# Patient Record
Sex: FEMALE | Race: WHITE | NOT HISPANIC OR LATINO | Employment: STUDENT | URBAN - METROPOLITAN AREA
[De-identification: names, ages, dates, MRNs, and addresses within clinical notes are randomized per-mention and may not be internally consistent; named-entity substitution may affect disease eponyms.]

---
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
## 2023-04-23 ENCOUNTER — HOSPITAL ENCOUNTER (EMERGENCY)
Facility: CLINIC | Age: 23
Discharge: HOME | End: 2023-04-23
Attending: EMERGENCY MEDICINE | Admitting: EMERGENCY MEDICINE
Payer: COMMERCIAL

## 2023-04-23 VITALS
OXYGEN SATURATION: 99 % | TEMPERATURE: 98.5 F | DIASTOLIC BLOOD PRESSURE: 75 MMHG | HEIGHT: 64 IN | HEART RATE: 90 BPM | BODY MASS INDEX: 24.24 KG/M2 | SYSTOLIC BLOOD PRESSURE: 129 MMHG | RESPIRATION RATE: 18 BRPM | WEIGHT: 142 LBS

## 2023-04-23 DIAGNOSIS — T65.91XA INGESTION OF SUBSTANCE, ACCIDENTAL OR UNINTENTIONAL, INITIAL ENCOUNTER: ICD-10-CM

## 2023-04-23 LAB
ALBUMIN SERPL BCG-MCNC: 5 G/DL (ref 3.5–5.2)
ALP SERPL-CCNC: 66 U/L (ref 35–104)
ALT SERPL W P-5'-P-CCNC: 22 U/L (ref 10–35)
AMPHETAMINES UR QL SCN: NORMAL
ANION GAP SERPL CALCULATED.3IONS-SCNC: 13 MMOL/L (ref 7–15)
AST SERPL W P-5'-P-CCNC: 25 U/L (ref 10–35)
BARBITURATES UR QL SCN: NORMAL
BASOPHILS # BLD AUTO: 0 10E3/UL (ref 0–0.2)
BASOPHILS NFR BLD AUTO: 0 %
BENZODIAZ UR QL SCN: NORMAL
BILIRUB SERPL-MCNC: 0.8 MG/DL
BUN SERPL-MCNC: 11.4 MG/DL (ref 6–20)
BZE UR QL SCN: NORMAL
CALCIUM SERPL-MCNC: 10.1 MG/DL (ref 8.6–10)
CANNABINOIDS UR QL SCN: NORMAL
CHLORIDE SERPL-SCNC: 103 MMOL/L (ref 98–107)
CREAT SERPL-MCNC: 0.6 MG/DL (ref 0.51–0.95)
DEPRECATED HCO3 PLAS-SCNC: 22 MMOL/L (ref 22–29)
EOSINOPHIL # BLD AUTO: 0.1 10E3/UL (ref 0–0.7)
EOSINOPHIL NFR BLD AUTO: 1 %
ERYTHROCYTE [DISTWIDTH] IN BLOOD BY AUTOMATED COUNT: 12.8 % (ref 10–15)
ETHANOL SERPL-MCNC: <0.01 G/DL
GFR SERPL CREATININE-BSD FRML MDRD: >90 ML/MIN/1.73M2
GLUCOSE SERPL-MCNC: 92 MG/DL (ref 70–99)
GROUP A STREP BY PCR: NOT DETECTED
HCG SERPL QL: NEGATIVE
HCT VFR BLD AUTO: 44.8 % (ref 35–47)
HGB BLD-MCNC: 15.1 G/DL (ref 11.7–15.7)
IMM GRANULOCYTES # BLD: 0 10E3/UL
IMM GRANULOCYTES NFR BLD: 0 %
LIPASE SERPL-CCNC: 11 U/L (ref 13–60)
LYMPHOCYTES # BLD AUTO: 3.3 10E3/UL (ref 0.8–5.3)
LYMPHOCYTES NFR BLD AUTO: 31 %
MAGNESIUM SERPL-MCNC: 1.9 MG/DL (ref 1.7–2.3)
MCH RBC QN AUTO: 27.7 PG (ref 26.5–33)
MCHC RBC AUTO-ENTMCNC: 33.7 G/DL (ref 31.5–36.5)
MCV RBC AUTO: 82 FL (ref 78–100)
MONOCYTES # BLD AUTO: 0.8 10E3/UL (ref 0–1.3)
MONOCYTES NFR BLD AUTO: 8 %
MONOCYTES NFR BLD AUTO: NEGATIVE %
NEUTROPHILS # BLD AUTO: 6.4 10E3/UL (ref 1.6–8.3)
NEUTROPHILS NFR BLD AUTO: 60 %
NRBC # BLD AUTO: 0 10E3/UL
NRBC BLD AUTO-RTO: 0 /100
OPIATES UR QL SCN: NORMAL
PLATELET # BLD AUTO: 359 10E3/UL (ref 150–450)
POTASSIUM SERPL-SCNC: 3.7 MMOL/L (ref 3.4–5.3)
PROT SERPL-MCNC: 7.9 G/DL (ref 6.4–8.3)
RBC # BLD AUTO: 5.45 10E6/UL (ref 3.8–5.2)
SODIUM SERPL-SCNC: 138 MMOL/L (ref 136–145)
TROPONIN T SERPL HS-MCNC: <6 NG/L
WBC # BLD AUTO: 10.7 10E3/UL (ref 4–11)

## 2023-04-23 PROCEDURE — 99284 EMERGENCY DEPT VISIT MOD MDM: CPT | Mod: 25 | Performed by: EMERGENCY MEDICINE

## 2023-04-23 PROCEDURE — 83735 ASSAY OF MAGNESIUM: CPT | Performed by: EMERGENCY MEDICINE

## 2023-04-23 PROCEDURE — 87651 STREP A DNA AMP PROBE: CPT | Performed by: EMERGENCY MEDICINE

## 2023-04-23 PROCEDURE — 258N000003 HC RX IP 258 OP 636: Performed by: EMERGENCY MEDICINE

## 2023-04-23 PROCEDURE — 93010 ELECTROCARDIOGRAM REPORT: CPT | Performed by: EMERGENCY MEDICINE

## 2023-04-23 PROCEDURE — 80307 DRUG TEST PRSMV CHEM ANLYZR: CPT | Performed by: EMERGENCY MEDICINE

## 2023-04-23 PROCEDURE — 83690 ASSAY OF LIPASE: CPT | Performed by: EMERGENCY MEDICINE

## 2023-04-23 PROCEDURE — 82077 ASSAY SPEC XCP UR&BREATH IA: CPT | Performed by: EMERGENCY MEDICINE

## 2023-04-23 PROCEDURE — 86308 HETEROPHILE ANTIBODY SCREEN: CPT | Performed by: EMERGENCY MEDICINE

## 2023-04-23 PROCEDURE — 84484 ASSAY OF TROPONIN QUANT: CPT | Performed by: EMERGENCY MEDICINE

## 2023-04-23 PROCEDURE — 85004 AUTOMATED DIFF WBC COUNT: CPT | Performed by: EMERGENCY MEDICINE

## 2023-04-23 PROCEDURE — 84703 CHORIONIC GONADOTROPIN ASSAY: CPT | Performed by: EMERGENCY MEDICINE

## 2023-04-23 PROCEDURE — 36415 COLL VENOUS BLD VENIPUNCTURE: CPT | Performed by: EMERGENCY MEDICINE

## 2023-04-23 PROCEDURE — 93005 ELECTROCARDIOGRAM TRACING: CPT | Performed by: EMERGENCY MEDICINE

## 2023-04-23 PROCEDURE — 80053 COMPREHEN METABOLIC PANEL: CPT | Performed by: EMERGENCY MEDICINE

## 2023-04-23 PROCEDURE — 96360 HYDRATION IV INFUSION INIT: CPT | Performed by: EMERGENCY MEDICINE

## 2023-04-23 RX ORDER — SODIUM CHLORIDE 9 MG/ML
INJECTION, SOLUTION INTRAVENOUS CONTINUOUS
Status: DISCONTINUED | OUTPATIENT
Start: 2023-04-23 | End: 2023-04-23 | Stop reason: HOSPADM

## 2023-04-23 RX ORDER — HYDROXYZINE HYDROCHLORIDE 25 MG/1
25 TABLET, FILM COATED ORAL 3 TIMES DAILY PRN
COMMUNITY

## 2023-04-23 RX ORDER — FLUOXETINE 10 MG/1
CAPSULE ORAL DAILY
COMMUNITY

## 2023-04-23 RX ADMIN — SODIUM CHLORIDE 1000 ML: 9 INJECTION, SOLUTION INTRAVENOUS at 17:06

## 2023-04-23 RX ADMIN — SODIUM CHLORIDE: 0.9 INJECTION, SOLUTION INTRAVENOUS at 17:44

## 2023-04-23 ASSESSMENT — ACTIVITIES OF DAILY LIVING (ADL): ADLS_ACUITY_SCORE: 35
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
## 2023-04-23 NOTE — LETTER
[facility] EMERGENCY DEPARTMENT  [address] [place] [address]  [phone]      2023    Compa Sampson  [address] [address] [phone] (home)     : 2000      To Whom it may concern:    Compa Sampson was seen in our Emergency Department today, 2023.  Please excuse her from work today.      Sincerely,    Carley Davila MD
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
## 2023-04-23 NOTE — DISCHARGE INSTRUCTIONS
Please make an appointment to follow up with [facility] (phone: [phone]) as needed.      Continue to drink at least 8 glasses of electrolyte containing fluids a day to stay well hydrated.  If you have any worsening symptoms including chest pain, shortness of breath, feeling like you are going to pass out of other concerns, return to the emergency department for re-evaluation.

## 2023-04-24 LAB
ATRIAL RATE - MUSE: 86 BPM
DIASTOLIC BLOOD PRESSURE - MUSE: NORMAL MMHG
INTERPRETATION ECG - MUSE: NORMAL
P AXIS - MUSE: 35 DEGREES
PR INTERVAL - MUSE: 138 MS
QRS DURATION - MUSE: 88 MS
QT - MUSE: 376 MS
QTC - MUSE: 449 MS
R AXIS - MUSE: 49 DEGREES
SYSTOLIC BLOOD PRESSURE - MUSE: NORMAL MMHG
T AXIS - MUSE: 20 DEGREES
VENTRICULAR RATE- MUSE: 86 BPM

## 2024-12-22 ENCOUNTER — HOSPITAL ENCOUNTER (EMERGENCY)
Facility: CLINIC | Age: 24
Discharge: HOME | End: 2024-12-22
Attending: EMERGENCY MEDICINE | Admitting: EMERGENCY MEDICINE
Payer: COMMERCIAL

## 2024-12-22 VITALS
BODY MASS INDEX: 29.31 KG/M2 | WEIGHT: 171.7 LBS | TEMPERATURE: 98.4 F | HEART RATE: 95 BPM | HEIGHT: 64 IN | DIASTOLIC BLOOD PRESSURE: 79 MMHG | OXYGEN SATURATION: 99 % | SYSTOLIC BLOOD PRESSURE: 124 MMHG | RESPIRATION RATE: 16 BRPM

## 2024-12-22 DIAGNOSIS — F43.22 ADJUSTMENT DISORDER WITH ANXIOUS MOOD: ICD-10-CM

## 2024-12-22 DIAGNOSIS — F32.A DEPRESSION, UNSPECIFIED DEPRESSION TYPE: ICD-10-CM

## 2024-12-22 DIAGNOSIS — F41.9 ANXIETY: ICD-10-CM

## 2024-12-22 PROBLEM — F43.23 ADJUSTMENT DISORDER WITH MIXED ANXIETY AND DEPRESSED MOOD: Status: ACTIVE | Noted: 2024-12-22

## 2024-12-22 LAB — HCG UR QL: NEGATIVE

## 2024-12-22 PROCEDURE — 81025 URINE PREGNANCY TEST: CPT | Performed by: EMERGENCY MEDICINE

## 2024-12-22 PROCEDURE — 99283 EMERGENCY DEPT VISIT LOW MDM: CPT

## 2024-12-22 PROCEDURE — 99239 HOSP IP/OBS DSCHRG MGMT >30: CPT | Performed by: NURSE PRACTITIONER

## 2024-12-22 RX ORDER — HYDROXYZINE HYDROCHLORIDE 50 MG/1
1 TABLET, FILM COATED ORAL
COMMUNITY
Start: 2024-06-05

## 2024-12-22 RX ORDER — ALBUTEROL SULFATE 90 UG/1
2 INHALANT RESPIRATORY (INHALATION) EVERY 4 HOURS PRN
COMMUNITY
Start: 2024-06-03

## 2024-12-22 RX ORDER — FLUOXETINE 40 MG/1
40 CAPSULE ORAL DAILY
COMMUNITY
Start: 2024-10-04

## 2024-12-22 ASSESSMENT — ACTIVITIES OF DAILY LIVING (ADL)
ADLS_ACUITY_SCORE: 41

## 2024-12-22 ASSESSMENT — COLUMBIA-SUICIDE SEVERITY RATING SCALE - C-SSRS
2. HAVE YOU ACTUALLY HAD ANY THOUGHTS OF KILLING YOURSELF IN THE PAST MONTH?: NO
1. IN THE PAST MONTH, HAVE YOU WISHED YOU WERE DEAD OR WISHED YOU COULD GO TO SLEEP AND NOT WAKE UP?: NO
6. HAVE YOU EVER DONE ANYTHING, STARTED TO DO ANYTHING, OR PREPARED TO DO ANYTHING TO END YOUR LIFE?: NO

## 2024-12-22 NOTE — ED PROVIDER NOTES
"  Emergency Department Note      History of Present Illness     Chief Complaint   Anxiety      HPI   Compa Sampson is a 24 year old female presents for evaluation of anxiety and depression.  Patient reports that she is felt depressed for months though feels as though her symptoms are getting worse.  She notes stressors include relationship issues.  She denies suicidal or homicidal ideation.  Patient is established with a therapist and endorses compliance with with medications.  Currently her therapist is on vacation for the next 2 weeks.  Denies illicit drug use, significant alcohol usage, or intentional overdose.    Independent Historian   None    Review of External Notes   N/A    Past Medical History     Medical History and Problem List   Depression  Anxiety    Medications   albuterol (PROAIR HFA/PROVENTIL HFA/VENTOLIN HFA) 108 (90 Base) MCG/ACT inhaler  FLUoxetine (PROZAC) 40 MG capsule  hydrOXYzine HCl (ATARAX) 50 MG tablet  benzonatate (TESSALON) 100 MG capsule        Surgical History   No past surgical history on file.    Physical Exam     Patient Vitals for the past 24 hrs:   BP Temp Temp src Pulse Resp SpO2 Height Weight   12/22/24 1538 124/79 98.4  F (36.9  C) Oral 95 16 99 % 1.626 m (5' 4\") 77.9 kg (171 lb 11.2 oz)   12/22/24 1336 126/85 98.2  F (36.8  C) -- 118 14 97 % -- --     Physical Exam  General: Patient in moderate emotional distress.  Tearful on exam.  Alert and cooperative with exam. Normal mentation  HEENT: NC/AT. Conjunctiva without injection or scleral icterus. External ears normal.  Respiratory: Breathing comfortably on room air  CV: Normal rate, all extremities well perfused  GI:  Non-distended abdomen  Skin: Warm, dry, no rashes/open wounds on exposed skin  Musculoskeletal: No obvious deformities  Neuro: Alert, answers questions appropriately. No gross motor deficits  Psychiatric: Endorses depression and anxiety.  Denies suicidal homicidal ideation      Diagnostics     Lab " Results   Labs Ordered and Resulted from Time of ED Arrival to Time of ED Departure   HCG QUALITATIVE URINE - Normal       Result Value    hCG Urine Qualitative Negative           Independent Interpretation   None    ED Course      Medications Administered   Medications - No data to display    Procedures   Procedures     Discussion of Management   None    ED Course        Additional Documentation  None    Medical Decision Making / Diagnosis     CMS Diagnoses: None    MIPS       None    MDM   Compa Sampson is a 24 year old female who presents for evaluation of depressed mood and anxiety.  There is history of depression in the past and they are on medications. There is no signs at this point of a general medical problem causing depression and patient denies toxic ingestion, illicit drug use, intentional overdose, or current intoxication.  No indication for mental health hold.  Denies suicidal homicidal ideation.  Patient is agreeable to voluntary assessment in the empath unit.  Medically clear for transfer to empath.    Disposition   The patient was transferred to [facility].     Diagnosis     ICD-10-CM    1. Anxiety  F41.9       2. Depression, unspecified depression type  F32.A       3. Adjustment disorder with anxious mood  F43.22                   Bernabe Henson, DO  12/22/24 1939

## 2024-12-22 NOTE — ED TRIAGE NOTES
Pt has been experiencing multiple stressors and worsening anxiety, her therapist is out of town for 2 weeks, denies suicidal ideation

## 2024-12-22 NOTE — ED NOTES
[facility]  ED to EMPATH Checklist:      Goal for EMPATH: Depression management and Anxiety management    Current Behavior: Sad    Safety Concerns: None    Legal Hold Status: Voluntary    Medically Cleared by ED provider: Yes    Patient Therapeutically Searched: Not searched - Currently in triage    Belongings: Remain with patient    Independent Ambulation at Baseline: Yes/No: Yes    Participates in Care/Conversation: Yes/No: Yes    Patient Informed about EMPATH: Yes/No: Yes    DEC: Ordered and pending    Patient Ready to be Transferred to EMPATH? Yes/No: Yes

## 2024-12-22 NOTE — CONSULTS
"Diagnostic Evaluation Consultation  Crisis Assessment    Patient Name: Compa Sampson  Age:  24 year old  Legal Sex: female  Gender Identity: female  Pronouns:   Race: White  Ethnicity: Not  or   Language: English      Patient was assessed: In person   Crisis Assessment Start Date: 12/22/24  Crisis Assessment Start Time: 1730  Crisis Assessment Stop Time: 1810  Patient location: [facility] EMERGENCY DEPT EMP 14                                 Referral Data and Chief Complaint  Compa Sampson presents to the ED by  self. Patient is presenting to the ED for the following concerns: Depression. Factors that make the mental health crisis life threatening or complex are: Patient presented to the emergency room due to depression in the context of a break up. Pt stated that her relationship with her ex-boyfriend ended six months ago when he moved to [place], [place] for a job. He did not want to be in a long-distance relationship, yet they have had ongoing  contact and \"hook ups\" since the break up. Her ex is in town now, and he told her today that he is pursuing a relationship with another woman that lives in [place]. Pt stated that she had an \"inkling,\" about this other girl, but the \"confirmation\" she got was unbearable for her. At the time of assessment pt was very tearful, emotionally distraught, and crying uncontrollably. She reported feeling lonely and undesirable by her ex. She shared that \"all\" of her friends have boyfriends, are , or engaged. Her thinking was rigid and catastrophic. She made statements such as \"I am going to be alone forever,\" and \"There is no one else, I only want him.\" She is fixated on having a conversation with him about why a long distance relationship with her can work. Pt was not receptive to any therapeutic interventions. She reported feeling passively suicidal, but denied active SI. She does not have intent to harm " "herself..      Informed Consent and Assessment Methods  Explained the crisis assessment process, including applicable information disclosures and limits to confidentiality, assessed understanding of the process, and obtained consent to proceed with the assessment.  Assessment methods included conducting a formal interview with patient, review of medical records, collaboration with medical staff, and obtaining relevant collateral information from family and community providers when available.  : done     History of the Crisis   Patient has a mental health history notable for anxiety and depression. She went to an urgent care during her initial break up with her ex-boyfriend on 06/24/24. Since then, pt stated that she has traveled to see him or she meets him when he comes back to [place]. Pt stated that he friends have told her to end contact with him, but she has found that too difficult to do. Pt has a therapist that she has been with for several years. She last met with her on Monday, when \"everything was just peachy then.\" Her therapist is out of the office for the next two weeks. Pt lives at home with her parents and brother. 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    Brief Psychosocial History  Family:  Single, Children no  Support System:  Parent(s), Friend  Employment Status:  employed full-time  Source of Income:  salary/wages  Financial Environmental Concerns:  none  Current Hobbies:     Barriers in Personal Life:  mental health concerns, emotional concerns    Significant Clinical History  Current Anxiety Symptoms:  excessive worry, racing thoughts, anxious  Current Depression/Trauma:  crying or feels like crying, helplessness, low self esteem, impaired decision making, negativistic, thoughts of death/suicide, sadness  Current Somatic Symptoms:     Current Psychosis/Thought Disturbance:  displaces blame  Current Eating Symptoms:  loss of appetite  Chemical Use " "History:  Alcohol: Social  Benzodiazepines: None  Opiates: None  Cocaine: None  Marijuana: None  Other Use: None   Past diagnosis:  Anxiety Disorder, Depression  Family history:  No known history of mental health or chemical health concerns  Past treatment:  Individual therapy, Primary Care  Details of most recent treatment:  Pt has a therapist that she has been with for several years. She last met with her on Monday, but is out of the office for the next two weeks.  Other relevant history:       Have there been any medication changes in the past two weeks:  no       Is the patient compliant with medications:  yes        Collateral Information  Is there collateral information: Yes     Collateral information name, relationship, phone number:  Rudy (Mother) [phone] (Mobile)    What happened today: Rudy was not aware that patient was in the ED until her  informed her. She is aware of pt's heartbreak and how pt went to the urgent care six months ago for similar concerns.     What is different about patient's functioning: Rudy stated that a couple of years ago, when pt was still in college, pt was started on medications. Rudy expressed concern that pt's doses got \"higher and higher.\" That is when she noticed that pt was more emotional.     Has patient made comments about wanting to kill themselves/others: no (Pt has made comments about not wanting to be alive.)    If d/c is recommended, can they take part in safety/aftercare planning: yes         Risk Assessment  [place] Suicide Severity Rating Scale Full Clinical Version:  Suicidal Ideation  Q1 Wish to be Dead (Lifetime): Yes  Q2 Non-Specific Active Suicidal Thoughts (Lifetime): No  3. Active Suicidal Ideation with any Methods (Not Plan) Without Intent to Act (Lifetime): No  Q4 Active Suicidal Ideation with Some Intent to Act, Without Specific Plan (Lifetime): No  Q5 Active Suicidal Ideation with Specific Plan and Intent (Lifetime): No  Q6 Suicide " Behavior (Lifetime): no  Intensity of Ideation (Lifetime)  Most Severe Ideation Rating (Lifetime): 1  Description of Most Severe Ideation (Lifetime): Pt reported having passive SI today.  Frequency (Lifetime): Less than once a week  Duration (Lifetime): Less than 1 hour/some of the time  Controllability (Lifetime): Can control thoughts with little difficulty  Deterrents (Lifetime): Deterrents definitely stopped you from attempting suicide  Reasons for Ideation (Lifetime): Equally to get attention, revenge, or a reaction from others and to end/stop the pain  Suicidal Behavior (Lifetime)  Actual Attempt (Lifetime): No  Has subject engaged in non-suicidal self-injurious behavior? (Lifetime): No  Interrupted Attempts (Lifetime): No  Aborted or Self-Interrupted Attempt (Lifetime): No  Preparatory Acts or Behavior (Lifetime): No    [place] Suicide Severity Rating Scale Recent:   Suicidal Ideation (Recent)  Q1 Wished to be Dead (Past Month): yes  Q2 Suicidal Thoughts (Past Month): no  Level of Risk per Screen: low risk  Intensity of Ideation (Recent)  Most Severe Ideation Rating (Past 1 Month): 1  Description of Most Severe Ideation (Past 1 Month): Pt reported having passive SI today.  Frequency (Past 1 Month): Less than once a week  Duration (Past 1 Month): Less than 1 hour/some of the time  Controllability (Past 1 Month): Can control thoughts with little difficulty  Deterrents (Past 1 Month): Deterrents definitely stopped you from attempting suicide  Reasons for Ideation (Past 1 Month): Equally to get attention, revenge, or a reaction from others and to end/stop the pain  Suicidal Behavior (Recent)  Actual Attempt (Past 3 Months): No  Has subject engaged in non-suicidal self-injurious behavior? (Past 3 Months): No  Interrupted Attempts (Past 3 Months): No  Preparatory Acts or Behavior (Past 3 Months): No    Environmental or Psychosocial Events: challenging interpersonal relationships, helplessness/hopelessness,  impulsivity/recklessness, other life stressors  Protective Factors: Protective Factors: strong bond to family unit, community support, or employment, responsibilities and duties to others, including pets and children, lives in a responsibly safe and stable environment, good treatment engagement, help seeking, able to access care without barriers, supportive ongoing medical and mental health care relationships, optimistic outlook - identification of future goals    Does the patient have thoughts of harming others? Feels Like Hurting Others: no  Previous Attempt to Hurt Others: no  Current presentation:  (tearful)  Is the patient engaging in sexually inappropriate behavior?: no  Does Patient have a known history of aggressive behavior: No  Has aggression occurred as a result of MH concerns/diagnosis: no  Does patient have history of aggression in hospital: no    Is the patient engaging in sexually inappropriate behavior?  no        Mental Status Exam   Affect: Dramatic  Appearance: Disheveled  Attention Span/Concentration: Attentive  Eye Contact: Engaged    Fund of Knowledge: Appropriate   Language /Speech Content: Fluent  Language /Speech Volume: Normal  Language /Speech Rate/Productions: Normal  Recent Memory: Intact  Remote Memory: Intact  Mood: Sad  Orientation to Person: Yes   Orientation to Place: Yes  Orientation to Time of Day: Yes  Orientation to Date: Yes     Situation (Do they understand why they are here?): Yes  Psychomotor Behavior: Normal  Thought Content: Clear  Thought Form: Goal Directed          Current Care Team  Patient Care Team:  No Ref-Primary, Physician as PCP - General    Diagnosis  Patient Active Problem List   Diagnosis Code    Adjustment disorder with mixed anxiety and depressed mood F43.23       Primary Problem This Admission  Active Hospital Problems    *Adjustment disorder with mixed anxiety and depressed mood        Clinical Summary and Substantiation of Recommendations   Clinical  "Substantiation:  Patient presented to the emergency room due to depression in the context of a break up. The break up occurred six months ago, which she sought help at an urgent care at the time. The ex moved to [place] and did not want to have a long-distance relationship, yet they have had ongoing  contact and \"hook ups\" since the break up. Pt's ex is in town now, and he told her today that he is pursuing a relationship with another woman that lives in [place]. Pt stated that this news was unbearable because she was hoping to get back together and attempt a long-distance relationship. She reported feeling lonely and undesirable by her ex. She reported feeling passively suicidal, but denied active SI. She does not have intent to harm herself. Her thinking was rigid and catastrophic. Pt was not receptive to any therapeutic interventions. She was interested in meeting with the psych provider to discuss her medications. She is requesting to discharge home. Pt's parents are in agreement to have pt discharge home. It is recommended that pt follow up with her outpatient mental health providers. Pt was offered to have therapy appointments scheduled while her therapist is out, but pt declined.        Treatment Objectives Addressed:  rapport building, orienting the patient to therapy, processing feelings, identifying an appropriate aftercare plan    Therapeutic Interventions:  Engaged in cognitive restructuring/ reframing, looked at common cognitive distortions and challenged negative thoughts., Taught the link between thoughts, feelings, and behaviors., Reviewed healthy living that supports positive mental health, including looking at sleep hygiene, regular movement, nutrition, and regular socialization., Provided positive reinforcement for progress towards goals, gains in knowledge, and application of skills previously taught., Introduced and explored accumulating positives., Introduced and practiced urge surfing.    Has " a specific means been identified for suicidal/homicide actions: No      Patient coping skills attempted to reduce the crisis:  Pt came to the ED voluntarily.    Disposition  Recommended referrals: Individual Therapy, Medication Management        Reviewed case and recommendations with attending provider. Attending Name: MARCO ANTONIO Smith       Attending concurs with disposition: yes       Patient and/or validated legal guardian concurs with disposition: yes       Final disposition:  discharge              Legal status: Voluntary/Patient has signed consent for treatment                                                                                                     Reviewed court records: yes       Assessment Details   Total duration spent with the patient: 40 min     CPT code(s) utilized: 82892 - Psychotherapy for Crisis - 60 (30-74*) min    BESSY Conrad, Psychotherapist  [facility] - Triage & Transition Services  Callback: [phone]

## 2024-12-22 NOTE — ED NOTES
"24 year old female transferred to [facility]  due to anxiety. Reports having multiple stressors including a breakup with her partner, which she is having significant trouble moving on from. Patient also cited conflict with her friends. She described a situation where she was not included in her friends wedding plans, which she said left her feeling more isolated and lonely. Patient also reports that her brother's friend moved into their house and the move has impacted her daily routine. Patient also cited that being around multiple family members whom she claims have drinking problems, which are contributing to her stress. Patient currently denies SI/HI, although she endorsed that she sometimes feels like \"I would be better off not being around.\"  Patient maintained she has no plan or intent to harm herself  or end her life. She mainly spoke about her feelings not being acknowledged. Reports she either sleeps too much or too little to escape her circumstances. Patient also said she came here because her therapist is out of town for 2  weeks. Previous MH history includes depression and anxiety. Patient presents with tearful affect, depressed in mood. Nursing and risk assessments completed. Assessments reviewed with LMHP and physician. Video monitoring in progress, patient informed.  Admission information reviewed with patient. Patient given a tour of [facility] and instructions on using the facility. Questions regarding [facility] addressed. Pt search completed and belongings inventoried.  "

## 2024-12-23 ENCOUNTER — TELEPHONE (OUTPATIENT)
Dept: BEHAVIORAL HEALTH | Facility: CLINIC | Age: 24
End: 2024-12-23
Payer: COMMERCIAL

## 2024-12-23 NOTE — ED NOTES
Patient has been discharged back to her home with a safety plan in place. Discharge instructions were reviewed with the patient. No medication changes were made during this visit. Reviewed safety plan and outpatient resources. Patient denies suicidal ideation and homicidal ideation. All belongings that were brought into the hospital have been returned to the patient. Escorted off the unit at 1925 accompanied by [facility] staff. Transportation was provided via her father.

## 2024-12-23 NOTE — ED PROVIDER NOTES
[facility] Unit - Psychiatry  Combined Observation Note and Discharge Summary  [facility] Emergency Department  Observation Initiation Date: Dec 22, 2024    Compa Sampson MRN: [id number]   Age: 24 year old YOB: 2000     History     Chief Complaint   Patient presents with    Anxiety     HPI  Compa Sampson is a 24 year old female with a past history notable for anxiety and adjustment disorder.  She came in today after experiencing significant anxiety secondary to breaking up with her boyfriend.  The actual break-up happened in June 2024.  Her ex-boyfriend recently came back and told her that he is in a relationship with another person.  This caused the patient significant distress and anxiety.  Patient stated that she has a therapist and her therapist is not available for the next 2 weeks.    Patient requested to discharge tonight to her parent's house.  She strongly denied thoughts of harming herself or others.  She denied ever experiencing thoughts of self-harm.  She said that she is very upset about the break-up and needs some space.  She told me that her family is supportive.  However, she will request from her family some time to grieve the relationship.  Patient was future oriented and said that she would possibly attend Prasad services at her [employer].  She declined medication changes.  She is currently prescribed Prozac 40 mg a day and hydroxyzine 50 mg daily as needed.  She said that she does not like to take hydroxyzine because it makes her sleepy.  At the same time, she acknowledged that the best thing for her to do would be to rest and not contact her boyfriend.  She also made a comment that 40 mg of Prozac might be too much and she will be contact her provider to discuss dose reduction.  At this time, she is not taking Prozac consistently.  Patient stated that her father was on his way to pick her up.  Patient agreed that while her therapist is not available it may be a  "good idea to reach out to her therapist's supervisor who may want to provide therapy services while the therapist is gone.  Patient stated that she will most likely go to work tomorrow.  She also said that she will journal today.  She stated that she feels comfortable and safe discharging.  Patient did not demonstrate any evidence of agueda or psychosis, delusional or paranoid ideation.  At the time of discharge she strongly denied thoughts of harming herself or others.      Past Medical History  No past medical history on file.  No past surgical history on file.  albuterol (PROAIR HFA/PROVENTIL HFA/VENTOLIN HFA) 108 (90 Base) MCG/ACT inhaler  FLUoxetine (PROZAC) 40 MG capsule  hydrOXYzine HCl (ATARAX) 50 MG tablet  benzonatate (TESSALON) 100 MG capsule      No Known Allergies  Family History  No family history on file.  Social History   Social History     Tobacco Use    Smoking status: Never    Smokeless tobacco: Never      Past medical history, past surgical history, medications, allergies, family history, and social history were reviewed with the patient. No additional pertinent items.       Review of Systems  A medically appropriate review of systems was performed with pertinent positives and negatives noted in the HPI, and all other systems negative.    Physical Examination   BP: 126/85  Pulse: 118  Temp: 98.2  F (36.8  C)  Resp: 14  Height: 162.6 cm (5' 4\")  Weight: 77.9 kg (171 lb 11.2 oz)  SpO2: 97 %    Physical Exam  General: Appears stated age.   Neuro: Alert and fully oriented. Extremities appear to demonstrate normal strength on visual inspection.   Integumentary/Skin: no rash visualized, normal color    Psychiatric Examination   Appearance: awake, alert  Attitude:  cooperative  Eye Contact:  good  Mood:  anxious  Affect:  mood congruent  Speech:  clear, coherent  Psychomotor Behavior:  no evidence of tardive dyskinesia, dystonia, or tics  Thought Process:  logical  Associations:  no loose " associations  Thought Content:  no evidence of suicidal ideation or homicidal ideation  Insight:  good  Judgement:  intact  Oriented to:  time, person, and place  Attention Span and Concentration:  intact  Recent and Remote Memory:  intact  Language: able to name/identify objects without impairment  Fund of Knowledge: intact with awareness of current and past events    ED Course        Labs Ordered and Resulted from Time of ED Arrival to Time of ED Departure   HCG QUALITATIVE URINE - Normal       Result Value    hCG Urine Qualitative Negative         Assessments & Plan (with Medical Decision Making)   Patient presenting with . Nursing notes reviewed noting no acute issues.     I have reviewed the assessment completed by the [facility].     During the observation period, the patient did not require medications for agitation, and did not require restraints/seclusion for patient and/or provider safety.    The patient was found to have a psychiatric condition that would benefit from an observation stay in the emergency department for further psychiatric stabilization and/or coordination of a safe disposition. The observation plan includes serial assessments of psychiatric condition, potential administration of medications if indicated, further disposition pending the patient's psychiatric course during the monitoring period.     Preliminary diagnosis:    ICD-10-CM    1. Anxiety  F41.9       2. Depression, unspecified depression type  F32.A            Treatment Plan:  -Discharged to home  -Continue home medications including Prozac 40 mg PO daily, hydroxyzine 50 mg PO daily as needed  -Does not meet criteria for a 72 hour hold due to no acute imminent risk to himself or others.  There is a higher risk due to  worsening symptoms, but at this time, that imminent risk is not present.  -At this time, patient does not meet criteria to be placed under an involuntary hold and will be discharged home per patient request.  -Medication  change was considered today. Current medications are providing clinical benefit with good tolerability. Thus, no changes were made.  -They report gaining benefit from the therapeutic milieu of the unit.  -Problem focused, supportive therapy provided around patients stressors and symptoms related to their diagnosis.  Validated patients emotions and problems solved with patient around stress management and self care strategies. Patient was receptive.   -Medication education provided this visit includes, rationale for medication, importance of compliance, medication interactions, and common side effects. Patient agreeable.  -The patient was educated regarding their differential diagnoses and the importance of adhering to their treatment plan and outpatient follow up appointments to aid with diagnostic clarity.    I, Jenn Schreiber, DNP, APRN, FMHNP-BC, have personally performed an examination of this patient on 24.  I have edited the note to reflect all relevant changes.  I have discussed this patient with the care team.  I have reviewed all vitals and laboratory findings.    Total time discharge plannin minutes     At the time of discharge, the patient's acute suicide risk was determined to be low due to the following factors: Reduction in the intensity of mood/anxiety symptoms that preceded the admission, denial of suicidal thoughts, denies feeling helpless or helpless, not currently under the influence of alcohol or illicit substances, denies experiencing command hallucinations, no immediate access to firearms. The patient's acute risk could be higher if noncompliant with their treatment plan, medications, follow-up appointments or using illicit substances or alcohol.      After the period in observation care, the patient's circumstances and mental state were safe for outpatient management. After counseling on the diagnosis, work-up, and treatment plan, the patient was discharged. Close  follow-up with a psychiatrist and/or therapist was recommended and community psychiatric resources were provided. Patient is to return to the ED if any urgent or potentially life-threatening concerns.      At the time of discharge, the patient's acute suicide risk was determined to be low due to the following factors: Reduction in the intensity of mood/anxiety symptoms that preceded the admission, denial of suicidal thoughts, denies feeling helpless or hopeless, not currently under the influence of alcohol or illicit substances, denies experiencing command hallucinations, no immediate access to firearms. The patient's acute risk could be higher if noncompliant with their treatment plan, medications, follow-up appointments or using illicit substances or alcohol. Protective factors include: social supports, stable housing, employment, Anab[employer]ism beliefs.    --  MARCO ANTONIO [facility] EMERGENCY DEPT  [facility] Unit        Jenn Leo APRN CNP  12/22/24 [id number]